# Patient Record
Sex: MALE | Race: WHITE | NOT HISPANIC OR LATINO | Employment: OTHER | URBAN - METROPOLITAN AREA
[De-identification: names, ages, dates, MRNs, and addresses within clinical notes are randomized per-mention and may not be internally consistent; named-entity substitution may affect disease eponyms.]

---

## 2019-07-30 ENCOUNTER — HOSPITAL ENCOUNTER (EMERGENCY)
Facility: HOSPITAL | Age: 84
Discharge: LEFT BEFORE DISCHARGE | End: 2019-07-05

## 2019-08-13 ENCOUNTER — CLINICAL SUPPORT NO REQUIREMENTS (OUTPATIENT)
Dept: CARDIOLOGY | Facility: CLINIC | Age: 84
End: 2019-08-13

## 2019-08-13 ENCOUNTER — OFFICE VISIT (OUTPATIENT)
Dept: CARDIOLOGY | Facility: CLINIC | Age: 84
End: 2019-08-13

## 2019-08-13 VITALS
HEART RATE: 60 BPM | WEIGHT: 132.5 LBS | DIASTOLIC BLOOD PRESSURE: 75 MMHG | OXYGEN SATURATION: 100 % | SYSTOLIC BLOOD PRESSURE: 154 MMHG

## 2019-08-13 DIAGNOSIS — Z95.0 PACEMAKER: ICD-10-CM

## 2019-08-13 DIAGNOSIS — I44.0 FIRST DEGREE ATRIOVENTRICULAR BLOCK: ICD-10-CM

## 2019-08-13 DIAGNOSIS — I10 ESSENTIAL HYPERTENSION: ICD-10-CM

## 2019-08-13 DIAGNOSIS — Z95.0 PACEMAKER: Primary | ICD-10-CM

## 2019-08-13 DIAGNOSIS — Z95.1 HX OF CABG: Primary | ICD-10-CM

## 2019-08-13 DIAGNOSIS — E78.5 DYSLIPIDEMIA: ICD-10-CM

## 2019-08-13 DIAGNOSIS — Z95.820 STATUS POST ANGIOPLASTY WITH STENT: ICD-10-CM

## 2019-08-13 PROCEDURE — 93000 ELECTROCARDIOGRAM COMPLETE: CPT | Performed by: INTERNAL MEDICINE

## 2019-08-13 PROCEDURE — 93280 PM DEVICE PROGR EVAL DUAL: CPT | Performed by: INTERNAL MEDICINE

## 2019-08-13 PROCEDURE — 99214 OFFICE O/P EST MOD 30 MIN: CPT | Performed by: INTERNAL MEDICINE

## 2019-08-13 RX ORDER — SIMVASTATIN 80 MG
TABLET ORAL
COMMUNITY
Start: 2014-09-24 | End: 2019-08-13

## 2019-08-13 RX ORDER — ATENOLOL 25 MG/1
TABLET ORAL
COMMUNITY
Start: 2014-09-24

## 2019-08-13 RX ORDER — OXYBUTYNIN CHLORIDE 5 MG/1
5 TABLET ORAL 3 TIMES DAILY
COMMUNITY

## 2019-08-13 RX ORDER — LISINOPRIL 5 MG/1
TABLET ORAL
COMMUNITY
Start: 2014-09-24 | End: 2019-08-13

## 2019-08-13 RX ORDER — NIACIN 500 MG
TABLET ORAL
COMMUNITY
Start: 2014-09-24 | End: 2019-08-13

## 2019-08-13 RX ORDER — CETIRIZINE HYDROCHLORIDE 10 MG/1
10 TABLET ORAL DAILY
COMMUNITY

## 2019-08-13 RX ORDER — FLUTICASONE PROPIONATE 50 MCG
SPRAY, SUSPENSION (ML) NASAL
COMMUNITY
Start: 2017-05-11

## 2019-08-13 RX ORDER — MECLIZINE HCL 12.5 MG/1
12.5 TABLET ORAL 3 TIMES DAILY PRN
COMMUNITY

## 2019-08-13 NOTE — PROGRESS NOTES
Encounter Date:08/13/2019  Last seen 1/17/2019      Patient ID: Rajesh Hoyos is a 89 y.o. male.    Chief Complaint:  Status post CABG  Status post pacemaker  Hypertension  Dyslipidemia  Status post stent      History of Present Illness  Since I have last seen, the patient has been without any chest discomfort ,shortness of breath, palpitations, dizziness or syncope.  Denies having any headache ,abdominal pain ,nausea, vomiting , diarrhea constipation, loss of weight or loss of appetite.  Denies having any excessive bruising ,hematuria or blood in the stool.    Review of all systems negative except as indicated      Assessment and Plan       //////////////////////  Impression  ===============    - Status post CABG 06/2000.  Status post stent to graft to the right coronary artery 08/09/2013.      Collier to LAD was patent.  SVG to diagonal branch was patent.  SVG to the marginal branch had stent 60% mid segment disease (FFR did not indicate any significant disease).  Status post stent to graft to the right coronary artery and obtuse marginal branch normal 2009. Status post stent to graft to the right coronary artery in October 2008. 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    -status post permanent dual-chamber pacemaker implantation ( Opencaretronic MRI compatible) 01/07/2016.  There may be some atrial lead over sensing.  Reprogramming was done.    History of significant 1st degree AV block.  AV dissociation prior to CABG.  History of intermittent nonsustained ventricular tachycardia (asymptomatic.) event monitor was negative for advanced AV blocks In the past     - Hypertension and dyslipidemia     -History of prostate carcinoma.  Status post orchiectomy     -. Status post right lower lobectomy  ==============  Plan  ============  EKG showed dual-chamber pacemaker rhythm.   Patient is not having any angina pectoris or congestive heart failure.  Interrogation of the pacemaker revealed excellent pacing  parameters.     Followup in the office in Six months with pacemaker interrogation.    Medications are reviewed and updated.  ///////////////         Diagnosis Plan   1. Hx of CABG  ECG 12 Lead   2. Pacemaker  ECG 12 Lead   3. First degree atrioventricular block     4. Dyslipidemia     5. Essential hypertension  ECG 12 Lead   6. Status post angioplasty with stent  ECG 12 Lead   LAB RESULTS (LAST 7 DAYS)    CBC        BMP        CMP         BNP        TROPONIN        CoAg        Creatinine Clearance  CrCl cannot be calculated (No order found.).    ABG        Radiology  No radiology results for the last day                The following portions of the patient's history were reviewed and updated as appropriate: allergies, current medications, past family history, past medical history, past social history, past surgical history and problem list.    Review of Systems   Constitution: Positive for malaise/fatigue.   Cardiovascular: Negative for chest pain, leg swelling, palpitations and syncope.   Respiratory: Negative for shortness of breath.    Skin: Positive for rash (chest ).   Gastrointestinal: Negative for nausea and vomiting.   Neurological: Positive for light-headedness. Negative for dizziness and numbness.         Current Outpatient Medications:   •  aspirin 81 MG tablet, ASPIRIN 81 MG ORAL TABLET, Disp: , Rfl:   •  atenolol (TENORMIN) 25 MG tablet, ATENOLOL 25 MG TABS, Disp: , Rfl:   •  cetirizine (zyrTEC) 10 MG tablet, Take 10 mg by mouth Daily., Disp: , Rfl:   •  fluticasone (FLONASE) 50 MCG/ACT nasal spray, FLUTICASONE PROPIONATE 50 MCG/ACT SUSP, Disp: , Rfl:   •  meclizine (ANTIVERT) 12.5 MG tablet, Take 12.5 mg by mouth 3 (Three) Times a Day As Needed for dizziness., Disp: , Rfl:   •  oxybutynin (DITROPAN) 5 MG tablet, Take 5 mg by mouth 3 (Three) Times a Day., Disp: , Rfl:     No Known Allergies    Family History   Problem Relation Age of Onset   • Heart disease Maternal Uncle        Past Surgical History:    Procedure Laterality Date   • CORONARY ANGIOPLASTY WITH STENT PLACEMENT  2013/2009/2008   • CORONARY ARTERY BYPASS GRAFT     • PACEMAKER IMPLANTATION  2016       Past Medical History:   Diagnosis Date   • Hyperlipidemia    • Hypertension        Family History   Problem Relation Age of Onset   • Heart disease Maternal Uncle        Social History     Socioeconomic History   • Marital status:      Spouse name: Not on file   • Number of children: Not on file   • Years of education: Not on file   • Highest education level: Not on file   Tobacco Use   • Smoking status: Former Smoker     Types: Cigarettes   • Smokeless tobacco: Never Used           ECG 12 Lead  Date/Time: 8/13/2019 3:24 PM  Performed by: Refugio Bartholomew MD  Authorized by: Refugio Bartholomew MD   Comparison: compared with previous ECG   Comments: Completely paced dual-chamber pacemaker rhythm.  60/min nonspecific ST-T wave changes no ectopy normal axis normal intervals.  No change from 1/17/2019              Objective:       Physical Exam    /75 (BP Location: Left arm, Patient Position: Sitting, Cuff Size: Adult)   Pulse 60   Wt 60.1 kg (132 lb 8 oz)   SpO2 100%   The patient is alert, oriented and in no distress.    Vital signs as noted above.    Head and neck revealed no carotid bruits or jugular venous distension.  No thyromegaly or lymphadenopathy is present.    Lungs clear.  No wheezing.  Breath sounds are normal bilaterally.    Heart normal first and second heart sounds.  No murmur..  No pericardial rub is present.  No gallop is present.    Abdomen soft and nontender.  No organomegaly is present.    Extremities revealed good peripheral pulses without any pedal edema.    Skin warm and dry.  Pacemaker site looks normal.    Musculoskeletal system is grossly normal.    CNS grossly normal.

## 2019-11-20 ENCOUNTER — CLINICAL SUPPORT NO REQUIREMENTS (OUTPATIENT)
Dept: CARDIOLOGY | Facility: CLINIC | Age: 84
End: 2019-11-20

## 2019-11-20 DIAGNOSIS — R00.1 BRADYCARDIA: Primary | ICD-10-CM

## 2019-11-20 DIAGNOSIS — Z95.0 PACEMAKER: ICD-10-CM

## 2019-11-20 PROCEDURE — 93296 REM INTERROG EVL PM/IDS: CPT | Performed by: INTERNAL MEDICINE

## 2019-11-20 PROCEDURE — 93294 REM INTERROG EVL PM/LDLS PM: CPT | Performed by: INTERNAL MEDICINE

## 2020-02-19 ENCOUNTER — CLINICAL SUPPORT NO REQUIREMENTS (OUTPATIENT)
Dept: CARDIOLOGY | Facility: CLINIC | Age: 85
End: 2020-02-19

## 2020-02-19 DIAGNOSIS — R00.1 BRADYCARDIA: ICD-10-CM

## 2020-02-19 DIAGNOSIS — Z95.0 PACEMAKER: Primary | ICD-10-CM

## 2020-02-27 ENCOUNTER — CLINICAL SUPPORT NO REQUIREMENTS (OUTPATIENT)
Dept: CARDIOLOGY | Facility: CLINIC | Age: 85
End: 2020-02-27

## 2020-02-27 ENCOUNTER — OFFICE VISIT (OUTPATIENT)
Dept: CARDIOLOGY | Facility: CLINIC | Age: 85
End: 2020-02-27

## 2020-02-27 VITALS
SYSTOLIC BLOOD PRESSURE: 159 MMHG | HEIGHT: 69 IN | BODY MASS INDEX: 20.18 KG/M2 | OXYGEN SATURATION: 97 % | WEIGHT: 136.25 LBS | HEART RATE: 67 BPM | DIASTOLIC BLOOD PRESSURE: 66 MMHG

## 2020-02-27 DIAGNOSIS — Z95.820 STATUS POST ANGIOPLASTY WITH STENT: ICD-10-CM

## 2020-02-27 DIAGNOSIS — I10 ESSENTIAL HYPERTENSION: Primary | ICD-10-CM

## 2020-02-27 DIAGNOSIS — Z95.0 PACEMAKER: ICD-10-CM

## 2020-02-27 DIAGNOSIS — I44.0 FIRST DEGREE ATRIOVENTRICULAR BLOCK: ICD-10-CM

## 2020-02-27 DIAGNOSIS — E78.5 DYSLIPIDEMIA: ICD-10-CM

## 2020-02-27 DIAGNOSIS — I44.0 FIRST DEGREE ATRIOVENTRICULAR BLOCK: Primary | ICD-10-CM

## 2020-02-27 DIAGNOSIS — Z95.1 HX OF CABG: ICD-10-CM

## 2020-02-27 PROCEDURE — 99214 OFFICE O/P EST MOD 30 MIN: CPT | Performed by: INTERNAL MEDICINE

## 2020-02-27 PROCEDURE — 93280 PM DEVICE PROGR EVAL DUAL: CPT | Performed by: INTERNAL MEDICINE

## 2020-02-27 PROCEDURE — 93000 ELECTROCARDIOGRAM COMPLETE: CPT | Performed by: INTERNAL MEDICINE

## 2020-02-27 NOTE — PROGRESS NOTES
Encounter Date:02/27/2020  Last seen 8/13/2019      Patient ID: Rajesh Hoyos is a 89 y.o. male.    Chief Complaint:  Status post stent  Status post CABG  Status post pacemaker  Hypertension  Dyslipidemia      History of Present Illness  Occasional lightheadedness.  Since I have last seen, the patient has been without any chest discomfort ,shortness of breath, palpitations, dizziness or syncope.  Denies having any headache ,abdominal pain ,nausea, vomiting , diarrhea constipation, loss of weight or loss of appetite.  Denies having any excessive bruising ,hematuria or blood in the stool.    Review of all systems negative except as indicated  Assessment and Plan     //////////////////////  Impression  ===============    - Status post CABG 06/2000.  Status post stent to graft to the right coronary artery 08/09/2013.       Collier to LAD was patent.  SVG to diagonal branch was patent.  SVG to the marginal branch had stent 60% mid segment disease (FFR did not indicate any significant disease).  Status post stent to graft to the right coronary artery and obtuse marginal branch normal 2009. Status post stent to graft to the right coronary artery in October 2008. 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     -status post permanent dual-chamber pacemaker implantation ( Medtronic MRI compatible) 01/07/2016.  There may be some atrial lead over sensing.  Reprogramming was done.     History of significant 1st degree AV block.  AV dissociation prior to CABG.  History of intermittent nonsustained ventricular tachycardia (asymptomatic.) event monitor was negative for advanced AV blocks In the past      - Hypertension and dyslipidemia      -History of prostate carcinoma.  Status post orchiectomy      -. Status post right lower lobectomy  ==============  Plan  ============  EKG showed dual-chamber pacemaker rhythm.  (Atrial sensed ventricular paced rhythm  Patient is not having any angina pectoris or congestive heart  failure.  Interrogation of the pacemaker revealed excellent pacing parameters.  Battery status is 4 years.  Followup in the office in Six months with pacemaker interrogation.  Medications are reviewed and updated.  Further plan will depend on patient's progress.  ///////////////             Diagnosis Plan   1. Essential hypertension  ECG 12 Lead   2. Status post angioplasty with stent     3. Hx of CABG     4. Dyslipidemia     5. Pacemaker     6. First degree atrioventricular block     LAB RESULTS (LAST 7 DAYS)    CBC        BMP        CMP         BNP        TROPONIN        CoAg        Creatinine Clearance  CrCl cannot be calculated (No successful lab value found.).    ABG        Radiology  No radiology results for the last day                The following portions of the patient's history were reviewed and updated as appropriate: allergies, current medications, past family history, past medical history, past social history, past surgical history and problem list.    Review of Systems   Constitution: Negative for malaise/fatigue.   Cardiovascular: Negative for chest pain, leg swelling, palpitations and syncope.   Respiratory: Negative for shortness of breath.    Skin: Negative for rash.   Gastrointestinal: Negative for nausea and vomiting.   Neurological: Positive for dizziness, light-headedness and vertigo. Negative for numbness.         Current Outpatient Medications:   •  aspirin 81 MG tablet, ASPIRIN 81 MG ORAL TABLET, Disp: , Rfl:   •  atenolol (TENORMIN) 25 MG tablet, ATENOLOL 25 MG TABS, Disp: , Rfl:   •  cetirizine (zyrTEC) 10 MG tablet, Take 10 mg by mouth Daily., Disp: , Rfl:   •  fluticasone (FLONASE) 50 MCG/ACT nasal spray, FLUTICASONE PROPIONATE 50 MCG/ACT SUSP, Disp: , Rfl:   •  meclizine (ANTIVERT) 12.5 MG tablet, Take 12.5 mg by mouth 3 (Three) Times a Day As Needed for dizziness., Disp: , Rfl:   •  oxybutynin (DITROPAN) 5 MG tablet, Take 5 mg by mouth 3 (Three) Times a Day., Disp: , Rfl:     No Known  "Allergies    Family History   Problem Relation Age of Onset   • Heart disease Maternal Uncle        Past Surgical History:   Procedure Laterality Date   • CORONARY ANGIOPLASTY WITH STENT PLACEMENT  2013/2009/2008   • CORONARY ARTERY BYPASS GRAFT     • PACEMAKER IMPLANTATION  2016       Past Medical History:   Diagnosis Date   • Hyperlipidemia    • Hypertension        Family History   Problem Relation Age of Onset   • Heart disease Maternal Uncle        Social History     Socioeconomic History   • Marital status:      Spouse name: Not on file   • Number of children: Not on file   • Years of education: Not on file   • Highest education level: Not on file   Tobacco Use   • Smoking status: Former Smoker     Types: Cigarettes   • Smokeless tobacco: Never Used           ECG 12 Lead  Date/Time: 2/27/2020 9:53 AM  Performed by: Refugio Bartholomew MD  Authorized by: Refugio Bartholomew MD   Comparison: compared with previous ECG   Similar to previous ECG  Comments: Atrial sensed ventricular paced rhythm 62/min nonspecific ST-T wave changes no ectopy.  No change from previous tracing              Objective:       Physical Exam    /66 (BP Location: Left arm, Patient Position: Sitting, Cuff Size: Adult)   Pulse 67   Ht 175.3 cm (69\")   Wt 61.8 kg (136 lb 4 oz)   SpO2 97%   BMI 20.12 kg/m²   The patient is alert, oriented and in no distress.    Vital signs as noted above.    Head and neck revealed no carotid bruits or jugular venous distension.  No thyromegaly or lymphadenopathy is present.    Lungs clear.  No wheezing.  Breath sounds are normal bilaterally.    Heart normal first and second heart sounds.  No murmur..  No pericardial rub is present.  No gallop is present.    Abdomen soft and nontender.  No organomegaly is present.    Extremities revealed good peripheral pulses without any pedal edema.    Skin warm and dry.  Pacemaker site looks normal.    Musculoskeletal system is grossly normal.    CNS grossly " normal.

## 2020-06-01 ENCOUNTER — CLINICAL SUPPORT NO REQUIREMENTS (OUTPATIENT)
Dept: CARDIOLOGY | Facility: CLINIC | Age: 85
End: 2020-06-01

## 2020-06-01 DIAGNOSIS — R00.1 BRADYCARDIA: ICD-10-CM

## 2020-06-01 DIAGNOSIS — Z95.0 PACEMAKER: Primary | ICD-10-CM

## 2020-06-01 PROCEDURE — 93294 REM INTERROG EVL PM/LDLS PM: CPT | Performed by: INTERNAL MEDICINE

## 2020-06-01 PROCEDURE — 93296 REM INTERROG EVL PM/IDS: CPT | Performed by: INTERNAL MEDICINE

## 2020-09-10 ENCOUNTER — CLINICAL SUPPORT NO REQUIREMENTS (OUTPATIENT)
Dept: CARDIOLOGY | Facility: CLINIC | Age: 85
End: 2020-09-10

## 2020-09-10 ENCOUNTER — OFFICE VISIT (OUTPATIENT)
Dept: CARDIOLOGY | Facility: CLINIC | Age: 85
End: 2020-09-10

## 2020-09-10 VITALS
SYSTOLIC BLOOD PRESSURE: 147 MMHG | DIASTOLIC BLOOD PRESSURE: 73 MMHG | HEIGHT: 69 IN | HEART RATE: 59 BPM | WEIGHT: 128 LBS | BODY MASS INDEX: 18.96 KG/M2

## 2020-09-10 DIAGNOSIS — I10 ESSENTIAL HYPERTENSION: ICD-10-CM

## 2020-09-10 DIAGNOSIS — Z95.0 PACEMAKER: Primary | ICD-10-CM

## 2020-09-10 DIAGNOSIS — R00.1 BRADYCARDIA: ICD-10-CM

## 2020-09-10 DIAGNOSIS — I44.0 FIRST DEGREE ATRIOVENTRICULAR BLOCK: ICD-10-CM

## 2020-09-10 DIAGNOSIS — Z95.820 STATUS POST ANGIOPLASTY WITH STENT: ICD-10-CM

## 2020-09-10 DIAGNOSIS — E78.5 DYSLIPIDEMIA: ICD-10-CM

## 2020-09-10 DIAGNOSIS — Z95.1 HX OF CABG: ICD-10-CM

## 2020-09-10 PROCEDURE — 93280 PM DEVICE PROGR EVAL DUAL: CPT | Performed by: INTERNAL MEDICINE

## 2020-09-10 PROCEDURE — 99214 OFFICE O/P EST MOD 30 MIN: CPT | Performed by: INTERNAL MEDICINE

## 2020-09-10 PROCEDURE — 93000 ELECTROCARDIOGRAM COMPLETE: CPT | Performed by: INTERNAL MEDICINE

## 2020-09-10 NOTE — PROGRESS NOTES
Encounter Date:09/10/2020  Last seen 2/27/2020      Patient ID: Rajesh Hoyos is a 89 y.o. male.    Chief Complaint:    Status post stent  Status post CABG  Status post pacemaker  Hypertension  Dyslipidemia        History of Present Illness    Since I have last seen, the patient has been without any chest discomfort ,shortness of breath, palpitations, dizziness or syncope.  Denies having any headache ,abdominal pain ,nausea, vomiting , diarrhea constipation, loss of weight or loss of appetite.  Denies having any excessive bruising ,hematuria or blood in the stool.    Review of all systems negative except as indicated.    Assessment and Plan      //////////////////////  Impression  ===============    - Status post CABG 06/2000.  Status post stent to graft to the right coronary artery 08/09/2013.       Collier to LAD was patent.  SVG to diagonal branch was patent.  SVG to the marginal branch had stent 60% mid segment disease (FFR did not indicate any significant disease).  Status post stent to graft to the right coronary artery and obtuse marginal branch normal 2009. Status post stent to graft to the right coronary artery in October 2008. 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     -status post permanent dual-chamber pacemaker implantation ( CUPRtronic MRI compatible) 01/07/2016.  There may be some atrial lead over sensing.  Reprogramming was done.     History of significant 1st degree AV block.  AV dissociation prior to CABG.  History of intermittent nonsustained ventricular tachycardia (asymptomatic.) event monitor was negative for advanced AV blocks In the past      - Hypertension and dyslipidemia      -History of prostate carcinoma.  Status post orchiectomy      -. Status post right lower lobectomy  ==============  Plan  ============  EKG showed dual-chamber pacemaker rhythm.    Patient is not having any angina pectoris or congestive heart failure.  Interrogation of the pacemaker revealed excellent  pacing parameters.  Battery status is 4 years.  Followup in the office in Six months with pacemaker interrogation.  Medications are reviewed and updated.  Further plan will depend on patient's progress.  ///////////////             Diagnosis Plan   1. Pacemaker     2. Bradycardia     3. Hx of CABG     4. Essential hypertension     5. Dyslipidemia     6. Status post angioplasty with stent     LAB RESULTS (LAST 7 DAYS)    CBC        BMP        CMP         BNP        TROPONIN        CoAg        Creatinine Clearance  CrCl cannot be calculated (No successful lab value found.).    ABG        Radiology  No radiology results for the last day                The following portions of the patient's history were reviewed and updated as appropriate: allergies, current medications, past family history, past medical history, past social history, past surgical history and problem list.    Review of Systems   Constitution: Negative for malaise/fatigue.   Cardiovascular: Negative for chest pain, leg swelling, palpitations and syncope.   Respiratory: Negative for shortness of breath.    Skin: Negative for rash.   Gastrointestinal: Negative for nausea and vomiting.   Neurological: Negative for dizziness, light-headedness and numbness.         Current Outpatient Medications:   •  aspirin 81 MG tablet, ASPIRIN 81 MG ORAL TABLET, Disp: , Rfl:   •  atenolol (TENORMIN) 25 MG tablet, ATENOLOL 25 MG TABS, Disp: , Rfl:   •  cetirizine (zyrTEC) 10 MG tablet, Take 10 mg by mouth Daily., Disp: , Rfl:   •  fluticasone (FLONASE) 50 MCG/ACT nasal spray, FLUTICASONE PROPIONATE 50 MCG/ACT SUSP, Disp: , Rfl:   •  meclizine (ANTIVERT) 12.5 MG tablet, Take 12.5 mg by mouth 3 (Three) Times a Day As Needed for dizziness., Disp: , Rfl:   •  oxybutynin (DITROPAN) 5 MG tablet, Take 5 mg by mouth 3 (Three) Times a Day., Disp: , Rfl:     No Known Allergies    Family History   Problem Relation Age of Onset   • Heart disease Maternal Uncle        Past Surgical  "History:   Procedure Laterality Date   • CORONARY ANGIOPLASTY WITH STENT PLACEMENT  2013/2009/2008   • CORONARY ARTERY BYPASS GRAFT     • PACEMAKER IMPLANTATION  2016       Past Medical History:   Diagnosis Date   • Hyperlipidemia    • Hypertension        Family History   Problem Relation Age of Onset   • Heart disease Maternal Uncle        Social History     Socioeconomic History   • Marital status:      Spouse name: Not on file   • Number of children: Not on file   • Years of education: Not on file   • Highest education level: Not on file   Tobacco Use   • Smoking status: Former Smoker     Types: Cigarettes   • Smokeless tobacco: Never Used           ECG 12 Lead  Date/Time: 9/10/2020 10:31 AM  Performed by: Refugio Bartholomew MD  Authorized by: Refugio Bartholomew MD   Comparison: compared with previous ECG   Similar to previous ECG  Comparison to previous ECG: Completely paced dual-chamber pacemaker rhythm 60/min nonspecific ST-T wave changes no ectopy no change from 2/27/2020                Objective:       Physical Exam    /73 (BP Location: Left arm, Patient Position: Sitting, Cuff Size: Adult)   Pulse 59   Ht 175.3 cm (69\")   Wt 58.1 kg (128 lb)   BMI 18.90 kg/m²   The patient is alert, oriented and in no distress.    Vital signs as noted above.    Head and neck revealed no carotid bruits or jugular venous distension.  No thyromegaly or lymphadenopathy is present.    Lungs clear.  No wheezing.  Breath sounds are normal bilaterally.    Heart normal first and second heart sounds.  No murmur..  No pericardial rub is present.  No gallop is present.    Abdomen soft and nontender.  No organomegaly is present.    Extremities revealed good peripheral pulses without any pedal edema.    Skin warm and dry.  Pacemaker site looks normal.    Musculoskeletal system is grossly normal.    CNS grossly normal.        "

## 2020-11-23 PROBLEM — E78.5 HYPERLIPIDEMIA: Status: ACTIVE | Noted: 2020-11-23

## 2021-04-01 ENCOUNTER — CLINICAL SUPPORT NO REQUIREMENTS (OUTPATIENT)
Dept: CARDIOLOGY | Facility: CLINIC | Age: 86
End: 2021-04-01

## 2021-04-01 ENCOUNTER — OFFICE VISIT (OUTPATIENT)
Dept: CARDIOLOGY | Facility: CLINIC | Age: 86
End: 2021-04-01

## 2021-04-01 VITALS
HEART RATE: 60 BPM | WEIGHT: 133.5 LBS | BODY MASS INDEX: 19.77 KG/M2 | HEIGHT: 69 IN | DIASTOLIC BLOOD PRESSURE: 98 MMHG | SYSTOLIC BLOOD PRESSURE: 129 MMHG | TEMPERATURE: 96.6 F | OXYGEN SATURATION: 98 %

## 2021-04-01 DIAGNOSIS — I44.0 FIRST DEGREE ATRIOVENTRICULAR BLOCK: ICD-10-CM

## 2021-04-01 DIAGNOSIS — Z95.0 PACEMAKER: Primary | ICD-10-CM

## 2021-04-01 DIAGNOSIS — E78.5 DYSLIPIDEMIA: ICD-10-CM

## 2021-04-01 DIAGNOSIS — Z95.1 HX OF CABG: ICD-10-CM

## 2021-04-01 DIAGNOSIS — Z95.820 STATUS POST ANGIOPLASTY WITH STENT: ICD-10-CM

## 2021-04-01 DIAGNOSIS — I10 ESSENTIAL HYPERTENSION: ICD-10-CM

## 2021-04-01 DIAGNOSIS — R00.1 BRADYCARDIA: ICD-10-CM

## 2021-04-01 DIAGNOSIS — R00.1 BRADYCARDIA, SINUS: ICD-10-CM

## 2021-04-01 PROCEDURE — 99214 OFFICE O/P EST MOD 30 MIN: CPT | Performed by: INTERNAL MEDICINE

## 2021-04-01 PROCEDURE — 93280 PM DEVICE PROGR EVAL DUAL: CPT | Performed by: INTERNAL MEDICINE

## 2021-04-01 RX ORDER — TAMSULOSIN HYDROCHLORIDE 0.4 MG/1
1 CAPSULE ORAL DAILY
COMMUNITY

## 2021-04-01 RX ORDER — LISINOPRIL 20 MG/1
20 TABLET ORAL DAILY
COMMUNITY

## 2021-04-01 NOTE — PROGRESS NOTES
Encounter Date:04/01/2021  Last seen 19 2020    Patient ID: Rajesh Hoyos is a 89 y.o. male.    Chief Complaint:  Status post stent  Status post CABG  Status post pacemaker  Hypertension  Dyslipidemia        History of Present Illness     Since I have last seen, the patient has been without any chest discomfort ,shortness of breath, palpitations, dizziness or syncope.  Denies having any headache ,abdominal pain ,nausea, vomiting , diarrhea constipation, loss of weight or loss of appetite.  Denies having any excessive bruising ,hematuria or blood in the stool.    Review of all systems negative except as indicated.    Reviewed ROS.     Assessment and Plan      //////////////////////  Impression  ===============    - Status post CABG 06/2000.  Status post stent to graft to the right coronary artery 08/09/2013.       Collier to LAD was patent.  SVG to diagonal branch was patent.  SVG to the marginal branch had stent 60% mid segment disease (FFR did not indicate any significant disease).  Status post stent to graft to the right coronary artery and obtuse marginal branch normal 2009. Status post stent to graft to the right coronary artery in October 2008. 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     -status post permanent dual-chamber pacemaker implantation ( Luminescenttronic MRI compatible) 01/07/2016.  There may be some atrial lead over sensing.  Reprogramming was done.     History of significant 1st degree AV block.  AV dissociation prior to CABG.  History of intermittent nonsustained ventricular tachycardia (asymptomatic.) event monitor was negative for advanced AV blocks In the past      - Hypertension and dyslipidemia      -History of prostate carcinoma.  Status post orchiectomy      -. Status post right lower lobectomy  ==============  Plan  ============  Status post CABG.  Patient is not having any angina pectoris or congestive heart failure.    Status post pacemaker  Pacemaker site looks  normal.  Interrogation of the pacemaker revealed excellent pacing parameters.  Battery status is 3 years.    Hypertension-129/98  Continue lisinopril atenolol    Dyslipidemia-diet controlled    Followup in the office in Six months with pacemaker interrogation.  Medications are reviewed and updated.  Further plan will depend on patient's progress.  ///////////////               Diagnosis Plan   1. Pacemaker     2. Hx of CABG     3. Essential hypertension     4. Bradycardia     5. Dyslipidemia     6. Status post angioplasty with stent     7. First degree atrioventricular block     LAB RESULTS (LAST 7 DAYS)    CBC        BMP        CMP         BNP        TROPONIN        CoAg        Creatinine Clearance  CrCl cannot be calculated (No successful lab value found.).    ABG        Radiology  No radiology results for the last day                The following portions of the patient's history were reviewed and updated as appropriate: allergies, current medications, past family history, past medical history, past social history, past surgical history and problem list.    Review of Systems   Constitutional: Negative for malaise/fatigue.   Cardiovascular: Negative for chest pain, leg swelling, palpitations and syncope.   Respiratory: Negative for shortness of breath.    Skin: Negative for rash.   Gastrointestinal: Negative for nausea and vomiting.   Neurological: Positive for dizziness. Negative for light-headedness and numbness.         Current Outpatient Medications:   •  aspirin 81 MG tablet, ASPIRIN 81 MG ORAL TABLET, Disp: , Rfl:   •  atenolol (TENORMIN) 25 MG tablet, ATENOLOL 25 MG TABS, Disp: , Rfl:   •  cetirizine (zyrTEC) 10 MG tablet, Take 10 mg by mouth Daily., Disp: , Rfl:   •  fluticasone (FLONASE) 50 MCG/ACT nasal spray, FLUTICASONE PROPIONATE 50 MCG/ACT SUSP, Disp: , Rfl:   •  lisinopril (PRINIVIL,ZESTRIL) 20 MG tablet, Take 20 mg by mouth Daily., Disp: , Rfl:   •  meclizine (ANTIVERT) 12.5 MG tablet, Take 12.5 mg  "by mouth 3 (Three) Times a Day As Needed for dizziness., Disp: , Rfl:   •  oxybutynin (DITROPAN) 5 MG tablet, Take 5 mg by mouth 3 (Three) Times a Day., Disp: , Rfl:   •  tamsulosin (FLOMAX) 0.4 MG capsule 24 hr capsule, Take 1 capsule by mouth Daily., Disp: , Rfl:     No Known Allergies    Family History   Problem Relation Age of Onset   • Heart disease Maternal Uncle        Past Surgical History:   Procedure Laterality Date   • CORONARY ANGIOPLASTY WITH STENT PLACEMENT  2013/2009/2008   • CORONARY ARTERY BYPASS GRAFT     • PACEMAKER IMPLANTATION  2016       Past Medical History:   Diagnosis Date   • Hyperlipidemia    • Hypertension        Family History   Problem Relation Age of Onset   • Heart disease Maternal Uncle        Social History     Socioeconomic History   • Marital status:      Spouse name: Not on file   • Number of children: Not on file   • Years of education: Not on file   • Highest education level: Not on file   Tobacco Use   • Smoking status: Former Smoker     Types: Cigarettes   • Smokeless tobacco: Never Used         Procedures      Objective:       Physical Exam    /98   Pulse 60   Temp 96.6 °F (35.9 °C)   Ht 175.3 cm (69\")   Wt 60.6 kg (133 lb 8 oz)   SpO2 98%   BMI 19.71 kg/m²   The patient is alert, oriented and in no distress.    Vital signs as noted above.    Head and neck revealed no carotid bruits or jugular venous distension.  No thyromegaly or lymphadenopathy is present.    Lungs clear.  No wheezing.  Breath sounds are normal bilaterally.    Heart normal first and second heart sounds.  No murmur..  No pericardial rub is present.  No gallop is present.    Abdomen soft and nontender.  No organomegaly is present.    Extremities revealed good peripheral pulses without any pedal edema.    Skin warm and dry.  Pacemaker site looks normal.    Musculoskeletal system is grossly normal.    CNS grossly normal.        "

## 2021-10-18 ENCOUNTER — CLINICAL SUPPORT NO REQUIREMENTS (OUTPATIENT)
Dept: CARDIOLOGY | Facility: CLINIC | Age: 86
End: 2021-10-18

## 2021-10-18 ENCOUNTER — OFFICE VISIT (OUTPATIENT)
Dept: CARDIOLOGY | Facility: CLINIC | Age: 86
End: 2021-10-18

## 2021-10-18 VITALS
HEIGHT: 69 IN | HEART RATE: 58 BPM | DIASTOLIC BLOOD PRESSURE: 64 MMHG | BODY MASS INDEX: 19.26 KG/M2 | SYSTOLIC BLOOD PRESSURE: 148 MMHG | WEIGHT: 130 LBS | OXYGEN SATURATION: 99 %

## 2021-10-18 DIAGNOSIS — Z95.1 HX OF CABG: ICD-10-CM

## 2021-10-18 DIAGNOSIS — R00.1 BRADYCARDIA, SINUS: ICD-10-CM

## 2021-10-18 DIAGNOSIS — Z95.0 PACEMAKER: Primary | ICD-10-CM

## 2021-10-18 DIAGNOSIS — I44.0 FIRST DEGREE ATRIOVENTRICULAR BLOCK: ICD-10-CM

## 2021-10-18 DIAGNOSIS — Z95.820 STATUS POST ANGIOPLASTY WITH STENT: ICD-10-CM

## 2021-10-18 DIAGNOSIS — E78.5 DYSLIPIDEMIA: ICD-10-CM

## 2021-10-18 DIAGNOSIS — I10 ESSENTIAL HYPERTENSION: ICD-10-CM

## 2021-10-18 PROCEDURE — 93000 ELECTROCARDIOGRAM COMPLETE: CPT | Performed by: INTERNAL MEDICINE

## 2021-10-18 PROCEDURE — 93280 PM DEVICE PROGR EVAL DUAL: CPT | Performed by: INTERNAL MEDICINE

## 2021-10-18 PROCEDURE — 99214 OFFICE O/P EST MOD 30 MIN: CPT | Performed by: INTERNAL MEDICINE

## 2021-10-18 NOTE — PROGRESS NOTES
Encounter Date:10/18/2021  Last seen 4/1/2021      Patient ID: Rajesh Hoyos is a 89 y.o. male.    Chief Complaint:  Status post stent  Status post CABG  Status post pacemaker  Hypertension  Dyslipidemia        History of Present Illness     Since I have last seen, the patient has been without any chest discomfort ,shortness of breath, palpitations, dizziness or syncope.  Denies having any headache ,abdominal pain ,nausea, vomiting , diarrhea constipation, loss of weight or loss of appetite.  Denies having any excessive bruising ,hematuria or blood in the stool.    Review of all systems negative except as indicated.    Reviewed ROS.   Assessment and Plan      //////////////////////  Impression  ===============    - Status post CABG 06/2000.  Status post stent to graft to the right coronary artery 08/09/2013.       Collier to LAD was patent.  SVG to diagonal branch was patent.  SVG to the marginal branch had stent 60% mid segment disease (FFR did not indicate any significant disease).  Status post stent to graft to the right coronary artery and obtuse marginal branch normal 2009. Status post stent to graft to the right coronary artery in October 2008. 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     -status post permanent dual-chamber pacemaker implantation ( Medtronic MRI compatible) 01/07/2016.  There may be some atrial lead over sensing.  Reprogramming was done.     History of significant 1st degree AV block.  AV dissociation prior to CABG.  History of intermittent nonsustained ventricular tachycardia (asymptomatic.) event monitor was negative for advanced AV blocks In the past      - Hypertension and dyslipidemia      -History of prostate carcinoma.  Status post orchiectomy      -. Status post right lower lobectomy  ==============  Plan  ============  Status post CABG.  Patient is not having any angina pectoris or congestive heart failure.  EKG showed dual-chamber pacemaker rhythm.     Status post  pacemaker  Pacemaker site looks normal.  Interrogation of the pacemaker revealed excellent pacing parameters.  Battery status is 2.5 years.     Hypertension-148/64  Continue lisinopril atenolol     Dyslipidemia-diet controlled     Followup in the office in Six months with pacemaker interrogation.    Medications are reviewed and updated.  Further plan will depend on patient's progress.  ///////////////                    Diagnosis Plan   1. Pacemaker     2. Bradycardia, sinus  ECG 12 Lead   3. Hx of CABG  ECG 12 Lead   4. First degree atrioventricular block     5. Dyslipidemia     6. Status post angioplasty with stent     7. Essential hypertension     LAB RESULTS (LAST 7 DAYS)    CBC        BMP        CMP         BNP        TROPONIN        CoAg        Creatinine Clearance  CrCl cannot be calculated (No successful lab value found.).    ABG        Radiology  No radiology results for the last day                The following portions of the patient's history were reviewed and updated as appropriate: allergies, current medications, past family history, past medical history, past social history, past surgical history and problem list.    Review of Systems   Constitutional: Negative for malaise/fatigue.   Cardiovascular: Negative for chest pain, leg swelling, palpitations and syncope.   Respiratory: Negative for shortness of breath.    Skin: Negative for rash.   Gastrointestinal: Negative for nausea and vomiting.   Neurological: Negative for dizziness, light-headedness and numbness.   All other systems reviewed and are negative.        Current Outpatient Medications:   •  aspirin 81 MG tablet, ASPIRIN 81 MG ORAL TABLET, Disp: , Rfl:   •  atenolol (TENORMIN) 25 MG tablet, ATENOLOL 25 MG TABS, Disp: , Rfl:   •  cetirizine (zyrTEC) 10 MG tablet, Take 10 mg by mouth Daily., Disp: , Rfl:   •  fluticasone (FLONASE) 50 MCG/ACT nasal spray, FLUTICASONE PROPIONATE 50 MCG/ACT SUSP, Disp: , Rfl:   •  lisinopril (PRINIVIL,ZESTRIL) 20  "MG tablet, Take 20 mg by mouth Daily., Disp: , Rfl:   •  meclizine (ANTIVERT) 12.5 MG tablet, Take 12.5 mg by mouth 3 (Three) Times a Day As Needed for dizziness., Disp: , Rfl:   •  oxybutynin (DITROPAN) 5 MG tablet, Take 5 mg by mouth 3 (Three) Times a Day., Disp: , Rfl:   •  tamsulosin (FLOMAX) 0.4 MG capsule 24 hr capsule, Take 1 capsule by mouth Daily., Disp: , Rfl:     No Known Allergies    Family History   Problem Relation Age of Onset   • Heart disease Maternal Uncle        Past Surgical History:   Procedure Laterality Date   • CORONARY ANGIOPLASTY WITH STENT PLACEMENT  2013/2009/2008   • CORONARY ARTERY BYPASS GRAFT     • PACEMAKER IMPLANTATION  2016       Past Medical History:   Diagnosis Date   • Hyperlipidemia    • Hypertension        Family History   Problem Relation Age of Onset   • Heart disease Maternal Uncle        Social History     Socioeconomic History   • Marital status:    Tobacco Use   • Smoking status: Former Smoker     Types: Cigarettes   • Smokeless tobacco: Never Used   Vaping Use   • Vaping Use: Never used   Substance and Sexual Activity   • Alcohol use: Not Currently   • Drug use: Never   • Sexual activity: Defer           ECG 12 Lead    Date/Time: 10/18/2021 10:52 AM  Performed by: Refugio Bartholomew MD  Authorized by: Refugio Bartholomew MD   Comparison: compared with previous ECG   Similar to previous ECG  Comparison to previous ECG: Dual-chamber pacemaker rhythm 59/min nonspecific ST-T wave changes no ectopy no significant change from 9/10/2020                Objective:       Physical Exam    /64 (BP Location: Right arm, Patient Position: Sitting, Cuff Size: Adult)   Pulse 58   Ht 175.3 cm (69\")   Wt 59 kg (130 lb)   SpO2 99%   BMI 19.20 kg/m²   The patient is alert, oriented and in no distress.    Vital signs as noted above.    Head and neck revealed no carotid bruits or jugular venous distension.  No thyromegaly or lymphadenopathy is present.    Lungs clear.  No " wheezing.  Breath sounds are normal bilaterally.    Heart normal first and second heart sounds.  No murmur..  No pericardial rub is present.  No gallop is present.    Abdomen soft and nontender.  No organomegaly is present.    Extremities revealed good peripheral pulses without any pedal edema.    Skin warm and dry. Pacemaker site looks normal.    Musculoskeletal system is grossly normal.    CNS grossly normal.    Reviewed and unchanged from last visit.

## 2022-05-02 ENCOUNTER — CLINICAL SUPPORT NO REQUIREMENTS (OUTPATIENT)
Dept: CARDIOLOGY | Facility: CLINIC | Age: 87
End: 2022-05-02

## 2022-05-02 ENCOUNTER — OFFICE VISIT (OUTPATIENT)
Dept: CARDIOLOGY | Facility: CLINIC | Age: 87
End: 2022-05-02

## 2022-05-02 VITALS
DIASTOLIC BLOOD PRESSURE: 60 MMHG | BODY MASS INDEX: 19.11 KG/M2 | HEART RATE: 61 BPM | OXYGEN SATURATION: 97 % | HEIGHT: 69 IN | WEIGHT: 129 LBS | SYSTOLIC BLOOD PRESSURE: 123 MMHG

## 2022-05-02 DIAGNOSIS — Z95.1 HX OF CABG: ICD-10-CM

## 2022-05-02 DIAGNOSIS — Z95.0 PACEMAKER: Primary | ICD-10-CM

## 2022-05-02 DIAGNOSIS — E78.5 DYSLIPIDEMIA: ICD-10-CM

## 2022-05-02 DIAGNOSIS — I10 ESSENTIAL HYPERTENSION: ICD-10-CM

## 2022-05-02 DIAGNOSIS — Z95.820 STATUS POST ANGIOPLASTY WITH STENT: ICD-10-CM

## 2022-05-02 DIAGNOSIS — R00.1 BRADYCARDIA, SINUS: ICD-10-CM

## 2022-05-02 DIAGNOSIS — I44.0 FIRST DEGREE ATRIOVENTRICULAR BLOCK: ICD-10-CM

## 2022-05-02 PROCEDURE — 99214 OFFICE O/P EST MOD 30 MIN: CPT | Performed by: INTERNAL MEDICINE

## 2022-05-02 PROCEDURE — 93000 ELECTROCARDIOGRAM COMPLETE: CPT | Performed by: INTERNAL MEDICINE

## 2022-05-02 PROCEDURE — 93280 PM DEVICE PROGR EVAL DUAL: CPT | Performed by: INTERNAL MEDICINE

## 2022-05-02 NOTE — PROGRESS NOTES
Encounter Date:05/02/2022  Last seen 10/18/2021    Patient ID: Rajesh Hoyos is a 89 y.o. male.    Chief Complaint:  Status post stent  Status post CABG  Status post pacemaker  Hypertension  Dyslipidemia        History of Present Illness     Since I have last seen, the patient has been without any chest discomfort ,shortness of breath, palpitations, dizziness or syncope.  Denies having any headache ,abdominal pain ,nausea, vomiting , diarrhea constipation, loss of weight or loss of appetite.  Denies having any excessive bruising ,hematuria or blood in the stool.    Review of all systems negative except as indicated.    Reviewed ROS.    Assessment and Plan      //////////////////////  Impression  ===============    - Status post CABG 06/2000.    Status post stent to graft to the right coronary artery 08/09/2013.       Collier to LAD was patent.  SVG to diagonal branch was patent.  SVG to the marginal branch had stent 60% mid segment disease (FFR did not indicate any significant disease).  Status post stent to graft to the right coronary artery and obtuse marginal branch normal 2009. Status post stent to graft to the right coronary artery in October 2008. 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     -status post permanent dual-chamber pacemaker implantation ( Medtronic MRI compatible) 01/07/2016.  There may be some atrial lead over sensing.  Reprogramming was done.     History of significant 1st degree AV block.  AV dissociation prior to CABG.  History of intermittent nonsustained ventricular tachycardia (asymptomatic.) event monitor was negative for advanced AV blocks In the past      - Hypertension and dyslipidemia      -History of prostate carcinoma.  Status post orchiectomy      -. Status post right lower lobectomy  ==============  Plan  ============  Status post CABG.  Patient is not having any angina pectoris or congestive heart failure.  EKG showed dual-chamber pacemaker rhythm.-5/2/2022     Status  post pacemaker  Pacemaker site looks normal.  Interrogation of the pacemaker revealed excellent pacing parameters.  Battery status is 2.5 years.     Hypertension- 123/60  Continue lisinopril atenolol     Dyslipidemia-diet controlled     Followup in the office in Six months with pacemaker interrogation.     Medications are reviewed and updated.    Further plan will depend on patient's progress.  ///////////////                           Diagnosis Plan   1. Pacemaker  ECG 12 Lead   2. Bradycardia, sinus  ECG 12 Lead   3. Hx of CABG  ECG 12 Lead   4. Status post angioplasty with stent  ECG 12 Lead   5. Dyslipidemia  ECG 12 Lead   6. Essential hypertension  ECG 12 Lead   7. First degree atrioventricular block  ECG 12 Lead   LAB RESULTS (LAST 7 DAYS)    CBC        BMP        CMP         BNP        TROPONIN        CoAg        Creatinine Clearance  CrCl cannot be calculated (No successful lab value found.).    ABG        Radiology  No radiology results for the last day                The following portions of the patient's history were reviewed and updated as appropriate: allergies, current medications, past family history, past medical history, past social history, past surgical history and problem list.    Review of Systems   Constitutional: Negative for malaise/fatigue.   Cardiovascular: Negative for chest pain, leg swelling, palpitations and syncope.   Respiratory: Negative for shortness of breath.    Skin: Negative for rash.   Gastrointestinal: Negative for nausea and vomiting.   Neurological: Negative for dizziness, light-headedness and numbness.   All other systems reviewed and are negative.        Current Outpatient Medications:   •  atenolol (TENORMIN) 25 MG tablet, ATENOLOL 25 MG TABS, Disp: , Rfl:   •  cetirizine (zyrTEC) 10 MG tablet, Take 10 mg by mouth Daily., Disp: , Rfl:   •  fluticasone (FLONASE) 50 MCG/ACT nasal spray, FLUTICASONE PROPIONATE 50 MCG/ACT SUSP, Disp: , Rfl:   •  lisinopril (PRINIVIL,ZESTRIL)  "20 MG tablet, Take 20 mg by mouth Daily., Disp: , Rfl:   •  aspirin 81 MG tablet, ASPIRIN 81 MG ORAL TABLET, Disp: , Rfl:   •  meclizine (ANTIVERT) 12.5 MG tablet, Take 12.5 mg by mouth 3 (Three) Times a Day As Needed for dizziness., Disp: , Rfl:   •  oxybutynin (DITROPAN) 5 MG tablet, Take 5 mg by mouth 3 (Three) Times a Day., Disp: , Rfl:   •  tamsulosin (FLOMAX) 0.4 MG capsule 24 hr capsule, Take 1 capsule by mouth Daily., Disp: , Rfl:     No Known Allergies    Family History   Problem Relation Age of Onset   • Heart disease Maternal Uncle        Past Surgical History:   Procedure Laterality Date   • CORONARY ANGIOPLASTY WITH STENT PLACEMENT  2013/2009/2008   • CORONARY ARTERY BYPASS GRAFT     • PACEMAKER IMPLANTATION  2016       Past Medical History:   Diagnosis Date   • Hyperlipidemia    • Hypertension        Family History   Problem Relation Age of Onset   • Heart disease Maternal Uncle        Social History     Socioeconomic History   • Marital status:    Tobacco Use   • Smoking status: Former Smoker     Types: Cigarettes   • Smokeless tobacco: Never Used   Vaping Use   • Vaping Use: Never used   Substance and Sexual Activity   • Alcohol use: Not Currently   • Drug use: Never   • Sexual activity: Defer           ECG 12 Lead    Date/Time: 5/2/2022 11:35 AM  Performed by: Refugio Bartholomew MD  Authorized by: Refugio Bartholomew MD   Comparison: compared with previous ECG   Similar to previous ECG  Comparison to previous ECG: Dual-chamber pacemaker rhythm 60/min nonspecific ST-T wave changes no ectopy no significant change from 10/18/2021                Objective:       Physical Exam    /60 (BP Location: Left arm, Patient Position: Sitting, Cuff Size: Adult)   Pulse 61   Ht 175.3 cm (69\")   Wt 58.5 kg (129 lb)   SpO2 97%   BMI 19.05 kg/m²   The patient is alert, oriented and in no distress.    Vital signs as noted above.    Head and neck revealed no carotid bruits or jugular venous distension.  " No thyromegaly or lymphadenopathy is present.    Lungs clear.  No wheezing.  Breath sounds are normal bilaterally.    Heart normal first and second heart sounds.  No murmur..  No pericardial rub is present.  No gallop is present.    Abdomen soft and nontender.  No organomegaly is present.    Extremities revealed good peripheral pulses without any pedal edema.    Skin warm and dry.  Pacemaker site looks normal    Musculoskeletal system is grossly normal.    CNS grossly normal.    Reviewed and updated.

## 2022-11-14 ENCOUNTER — OFFICE VISIT (OUTPATIENT)
Dept: CARDIOLOGY | Facility: CLINIC | Age: 87
End: 2022-11-14

## 2022-11-14 ENCOUNTER — CLINICAL SUPPORT NO REQUIREMENTS (OUTPATIENT)
Dept: CARDIOLOGY | Facility: CLINIC | Age: 87
End: 2022-11-14

## 2022-11-14 VITALS
SYSTOLIC BLOOD PRESSURE: 138 MMHG | DIASTOLIC BLOOD PRESSURE: 65 MMHG | HEIGHT: 69 IN | WEIGHT: 131 LBS | BODY MASS INDEX: 19.4 KG/M2 | HEART RATE: 63 BPM

## 2022-11-14 DIAGNOSIS — I10 ESSENTIAL HYPERTENSION: ICD-10-CM

## 2022-11-14 DIAGNOSIS — R00.1 BRADYCARDIA, SINUS: ICD-10-CM

## 2022-11-14 DIAGNOSIS — E78.5 DYSLIPIDEMIA: ICD-10-CM

## 2022-11-14 DIAGNOSIS — Z95.0 PACEMAKER: ICD-10-CM

## 2022-11-14 DIAGNOSIS — I44.0 FIRST DEGREE ATRIOVENTRICULAR BLOCK: ICD-10-CM

## 2022-11-14 DIAGNOSIS — Z95.0 PACEMAKER: Primary | ICD-10-CM

## 2022-11-14 DIAGNOSIS — Z95.1 HX OF CABG: Primary | ICD-10-CM

## 2022-11-14 DIAGNOSIS — Z95.820 STATUS POST ANGIOPLASTY WITH STENT: ICD-10-CM

## 2022-11-14 DIAGNOSIS — R00.1 BRADYCARDIA: ICD-10-CM

## 2022-11-14 PROCEDURE — 93000 ELECTROCARDIOGRAM COMPLETE: CPT | Performed by: INTERNAL MEDICINE

## 2022-11-14 PROCEDURE — 93280 PM DEVICE PROGR EVAL DUAL: CPT | Performed by: INTERNAL MEDICINE

## 2022-11-14 PROCEDURE — 99214 OFFICE O/P EST MOD 30 MIN: CPT | Performed by: INTERNAL MEDICINE

## 2022-11-14 NOTE — PROGRESS NOTES
Encounter Date:11/14/2022  Last seen 5/2/2022      Patient ID: Rajesh Hoyos is a 89 y.o. male.    Chief Complaint:  Status post stent  Status post CABG  Status post pacemaker  Hypertension  Dyslipidemia        History of Present Illness     Since I have last seen, the patient has been without any chest discomfort ,shortness of breath, palpitations, dizziness or syncope.  Denies having any headache ,abdominal pain ,nausea, vomiting , diarrhea constipation, loss of weight or loss of appetite.  Denies having any excessive bruising ,hematuria or blood in the stool.    Review of all systems negative except as indicated.    Reviewed ROS.  Assessment and Plan      //////////////////////  Impression  ===============    - Status post CABG 06/2000.    Status post stent to graft to the right coronary artery 08/09/2013.       Collier to LAD was patent.  SVG to diagonal branch was patent.  SVG to the marginal branch had stent 60% mid segment disease (FFR did not indicate any significant disease).  Status post stent to graft to the right coronary artery and obtuse marginal branch normal 2009. Status post stent to graft to the right coronary artery in October 2008. 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     -status post permanent dual-chamber pacemaker implantation ( Medtronic MRI compatible) 01/07/2016.  There may be some atrial lead over sensing.  Reprogramming was done.     History of significant 1st degree AV block.  AV dissociation prior to CABG.  History of intermittent nonsustained ventricular tachycardia (asymptomatic.) event monitor was negative for advanced AV blocks In the past      - Hypertension and dyslipidemia      -History of prostate carcinoma.  Status post orchiectomy      -. Status post right lower lobectomy  ==============  Plan  ============  Status post CABG.  Patient is not having any angina pectoris or congestive heart failure.  EKG showed dual-chamber pacemaker rhythm.-  11/14/2022    Status  post pacemaker  Pacemaker site looks normal.  Interrogation of the pacemaker revealed excellent pacing parameters.  Battery status is 2 years.     Hypertension-  138/65  Continue lisinopril atenolol     Dyslipidemia-diet controlled     Followup in the office in 6 months with pacemaker interrogation.     Medications are reviewed and updated.     Further plan will depend on patient's progress.  ///////////////                           Diagnosis Plan   1. Hx of CABG  ECG 12 Lead      2. Pacemaker        3. Bradycardia, sinus        4. Status post angioplasty with stent        5. First degree atrioventricular block        6. Dyslipidemia        7. Essential hypertension        8. Bradycardia        LAB RESULTS (LAST 7 DAYS)    CBC        BMP        CMP         BNP        TROPONIN        CoAg        Creatinine Clearance  CrCl cannot be calculated (No successful lab value found.).    ABG        Radiology  No radiology results for the last day                The following portions of the patient's history were reviewed and updated as appropriate: allergies, current medications, past family history, past medical history, past social history, past surgical history and problem list.    Review of Systems   Constitutional: Negative for malaise/fatigue.   Cardiovascular: Negative for chest pain, dyspnea on exertion, leg swelling and palpitations.   Respiratory: Negative for cough and shortness of breath.    Gastrointestinal: Negative for abdominal pain, nausea and vomiting.   Neurological: Negative for dizziness, focal weakness, headaches, light-headedness and numbness.   All other systems reviewed and are negative.        Current Outpatient Medications:   •  aspirin 81 MG tablet, ASPIRIN 81 MG ORAL TABLET, Disp: , Rfl:   •  atenolol (TENORMIN) 25 MG tablet, ATENOLOL 25 MG TABS, Disp: , Rfl:   •  cetirizine (zyrTEC) 10 MG tablet, Take 10 mg by mouth Daily., Disp: , Rfl:   •  fluticasone (FLONASE) 50 MCG/ACT nasal spray,  "FLUTICASONE PROPIONATE 50 MCG/ACT SUSP, Disp: , Rfl:   •  lisinopril (PRINIVIL,ZESTRIL) 20 MG tablet, Take 20 mg by mouth Daily., Disp: , Rfl:   •  meclizine (ANTIVERT) 12.5 MG tablet, Take 12.5 mg by mouth 3 (Three) Times a Day As Needed for dizziness., Disp: , Rfl:   •  oxybutynin (DITROPAN) 5 MG tablet, Take 5 mg by mouth 3 (Three) Times a Day., Disp: , Rfl:   •  tamsulosin (FLOMAX) 0.4 MG capsule 24 hr capsule, Take 1 capsule by mouth Daily., Disp: , Rfl:     No Known Allergies    Family History   Problem Relation Age of Onset   • Heart disease Maternal Uncle        Past Surgical History:   Procedure Laterality Date   • CORONARY ANGIOPLASTY WITH STENT PLACEMENT  2013/2009/2008   • CORONARY ARTERY BYPASS GRAFT     • PACEMAKER IMPLANTATION  2016       Past Medical History:   Diagnosis Date   • Hyperlipidemia    • Hypertension        Family History   Problem Relation Age of Onset   • Heart disease Maternal Uncle        Social History     Socioeconomic History   • Marital status:    Tobacco Use   • Smoking status: Former     Types: Cigarettes   • Smokeless tobacco: Never   Vaping Use   • Vaping Use: Never used   Substance and Sexual Activity   • Alcohol use: Not Currently   • Drug use: Never   • Sexual activity: Defer           ECG 12 Lead    Date/Time: 11/14/2022 11:42 AM  Performed by: Refugio Bartholomew MD  Authorized by: Refugio Bartholomew MD   Comparison: compared with previous ECG   Similar to previous ECG  Comparison to previous ECG: Dual-chamber pacemaker rhythm 63/min nonspecific ST-T wave changes no ectopy no significant change from 5/2/2022                Objective:       Physical Exam    /65 (BP Location: Right arm, Patient Position: Sitting, Cuff Size: Adult)   Pulse 63   Ht 175.3 cm (69\")   Wt 59.4 kg (131 lb)   BMI 19.35 kg/m²   The patient is alert, oriented and in no distress.    Vital signs as noted above.    Head and neck revealed no carotid bruits or jugular venous distension.  No " thyromegaly or lymphadenopathy is present.    Lungs clear.  No wheezing.  Breath sounds are normal bilaterally.    Heart normal first and second heart sounds.  No murmur..  No pericardial rub is present.  No gallop is present.    Abdomen soft and nontender.  No organomegaly is present.    Extremities revealed good peripheral pulses without any pedal edema.    Skin warm and dry.  Pacemaker site looks normal.    Musculoskeletal system is grossly normal.    CNS grossly normal.    Reviewed and updated.

## 2023-03-28 ENCOUNTER — TELEPHONE (OUTPATIENT)
Dept: CARDIOLOGY | Facility: CLINIC | Age: 88
End: 2023-03-28
Payer: MEDICARE

## 2023-03-28 NOTE — TELEPHONE ENCOUNTER
Caller: BRETT SAINI    Relationship: Emergency Contact    Best call back number: [phone]      PATIENT WAS EXPERIENCING SKIPPING IN HI HEART RATE, PLEASE CALL HIM TO DISCUSS

## 2023-03-28 NOTE — TELEPHONE ENCOUNTER
Spoke to patients emergency contact Mukesh titus states patient is wearing the heart monitor and would like for you to check on it when we [facility]. Patients emergency contact is going to schedule an appt for patient to be seen.

## 2023-04-03 ENCOUNTER — OFFICE VISIT (OUTPATIENT)
Dept: CARDIOLOGY | Facility: CLINIC | Age: 88
End: 2023-04-03
Payer: MEDICARE

## 2023-04-03 VITALS
DIASTOLIC BLOOD PRESSURE: 74 MMHG | HEART RATE: 84 BPM | HEIGHT: 69 IN | SYSTOLIC BLOOD PRESSURE: 137 MMHG | WEIGHT: 116 LBS | OXYGEN SATURATION: 97 % | BODY MASS INDEX: 17.18 KG/M2

## 2023-04-03 DIAGNOSIS — Z95.0 PACEMAKER: Primary | ICD-10-CM

## 2023-04-03 DIAGNOSIS — I44.0 FIRST DEGREE ATRIOVENTRICULAR BLOCK: ICD-10-CM

## 2023-04-03 DIAGNOSIS — I10 ESSENTIAL HYPERTENSION: ICD-10-CM

## 2023-04-03 DIAGNOSIS — I48.0 PAROXYSMAL ATRIAL FIBRILLATION: ICD-10-CM

## 2023-04-03 DIAGNOSIS — E78.5 DYSLIPIDEMIA: ICD-10-CM

## 2023-04-03 DIAGNOSIS — Z95.820 STATUS POST ANGIOPLASTY WITH STENT: ICD-10-CM

## 2023-04-03 DIAGNOSIS — R00.1 BRADYCARDIA, SINUS: ICD-10-CM

## 2023-04-03 DIAGNOSIS — Z95.1 HX OF CABG: ICD-10-CM

## 2023-04-03 PROCEDURE — 1160F RVW MEDS BY RX/DR IN RCRD: CPT | Performed by: INTERNAL MEDICINE

## 2023-04-03 PROCEDURE — 93000 ELECTROCARDIOGRAM COMPLETE: CPT | Performed by: INTERNAL MEDICINE

## 2023-04-03 PROCEDURE — 1159F MED LIST DOCD IN RCRD: CPT | Performed by: INTERNAL MEDICINE

## 2023-04-03 PROCEDURE — 99214 OFFICE O/P EST MOD 30 MIN: CPT | Performed by: INTERNAL MEDICINE

## 2023-04-03 NOTE — PROGRESS NOTES
"Encounter Date:04/03/2023    Last seen 11/14/2022      Patient ID: Rajesh Hoyos is a 89 y.o. male.    Chief Complaint:  Status post stent  Status post CABG  Status post pacemaker  Hypertension  Dyslipidemia        History of Present Illness  Patient recently was admitted to the hospital with a UTI and patient was told that he has \"irregular heart rhythm\".  Patient was started on metoprolol XL 50 mg a day.     Since I have last seen, the patient has been without any chest discomfort ,shortness of breath, palpitations, dizziness or syncope.  Denies having any headache ,abdominal pain ,nausea, vomiting , diarrhea constipation, loss of weight or loss of appetite.  Denies having any excessive bruising ,hematuria or blood in the stool.    Review of all systems negative except as indicated.    Reviewed ROS.    Assessment and Plan      //////////////////////  Impression  ===============    - Status post CABG 06/2000.    Status post stent to graft to the right coronary artery 08/09/2013.       [place] to LAD was patent.  SVG to diagonal branch was patent.  SVG to the marginal branch had stent 60% mid segment disease (FFR did not indicate any significant disease).  Status post stent to graft to the right coronary artery and obtuse marginal branch normal 2009. Status post stent to graft to the right coronary artery in October 2008. 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     -status post permanent dual-chamber pacemaker implantation ( [employer] MRI compatible) 01/07/2016.  There may be some atrial lead over sensing.  Reprogramming was done.     History of significant 1st degree AV block.  AV dissociation prior to CABG.  History of intermittent nonsustained ventricular tachycardia (asymptomatic.) event monitor was negative for advanced AV blocks In the past    - Paroxysmal atrial fibrillation     Hypertension and dyslipidemia      -History of prostate carcinoma.  Status post orchiectomy      -. Status post right " "lower lobectomy  ==============  Plan  ============  Patient recently was admitted to the hospital with a UTI and patient was told that he has \"irregular heart rhythm\".  Patient was started on metoprolol XL 50 mg a day.  EKG today showed ventricular pacemaker rhythm.  Intrinsic rhythm is atrial fibrillation.    Paroxysmal atrial fibrillation.  Continue metoprolol XL 50 mg a day and discontinue atenolol.    Anticoagulation was discussed with patient and patient's son.  Risk of cardioembolic episode versus bleeding was discussed.  Given his age etc. anticoagulation was thought to be high risk for him.  Option was given to patient and patient's son and they prefer to stay off anticoagulation.    Status post CABG.  Patient is not having any angina pectoris or congestive heart failure.  EKG showed dual-chamber pacemaker rhythm.-  11/14/2022  EKG 4/3/2023 revealed ventricular pacemaker rhythm intrinsic rhythm is atrial fibrillation 84/min.     Status post pacemaker  Pacemaker site looks normal.  Interrogation of the pacemaker revealed excellent pacing parameters.  Battery status is 2 years.     Hypertension- 137/74  Continue lisinopril atenolol     Dyslipidemia-diet controlled     Followup in the office at her regularly scheduled appointment with pacemaker interrogation.     Medications are reviewed and updated.     Further plan will depend on patient's progress.  ///////////////                           Diagnosis Plan   1. Pacemaker  ECG 12 Lead      2. Hx of CABG  ECG 12 Lead      3. Bradycardia, sinus  ECG 12 Lead      4. First degree atrioventricular block        5. Dyslipidemia        6. Status post angioplasty with stent        7. Essential hypertension        8. Paroxysmal atrial fibrillation        LAB RESULTS (LAST 7 DAYS)    CBC        BMP        CMP         BNP        TROPONIN        CoAg        Creatinine Clearance  CrCl cannot be calculated (No successful lab value found.).    ABG        Radiology  No " radiology results for the last day                The following portions of the patient's history were reviewed and updated as appropriate: allergies, current medications, past family history, past medical history, past social history, past surgical history and problem list.    Review of Systems   Constitutional: Negative for malaise/fatigue.   Cardiovascular: Negative for chest pain, dyspnea on exertion, leg swelling and palpitations.   Respiratory: Negative for cough and shortness of breath.    Gastrointestinal: Negative for abdominal pain, nausea and vomiting.   Neurological: Negative for dizziness, focal weakness, headaches, light-headedness and numbness.   All other systems reviewed and are negative.        Current Outpatient Medications:   •  aspirin 81 MG tablet, ASPIRIN 81 MG ORAL TABLET, Disp: , Rfl:   •  atenolol (TENORMIN) 25 MG tablet, ATENOLOL 25 MG TABS, Disp: , Rfl:   •  cetirizine (zyrTEC) 10 MG tablet, Take 1 tablet by mouth Daily., Disp: , Rfl:   •  fluticasone (FLONASE) 50 MCG/ACT nasal spray, FLUTICASONE PROPIONATE 50 MCG/ACT SUSP, Disp: , Rfl:   •  lisinopril (PRINIVIL,ZESTRIL) 20 MG tablet, Take 1 tablet by mouth Daily., Disp: , Rfl:   •  meclizine (ANTIVERT) 12.5 MG tablet, Take 1 tablet by mouth 3 (Three) Times a Day As Needed for Dizziness., Disp: , Rfl:   •  oxybutynin (DITROPAN) 5 MG tablet, Take 1 tablet by mouth 3 (Three) Times a Day., Disp: , Rfl:   •  tamsulosin (FLOMAX) 0.4 MG capsule 24 hr capsule, Take 1 capsule by mouth Daily., Disp: , Rfl:     No Known Allergies    Family History   Problem Relation Age of Onset   • Heart disease Maternal Uncle        Past Surgical History:   Procedure Laterality Date   • CORONARY ANGIOPLASTY WITH STENT PLACEMENT  2013/2009/2008   • CORONARY ARTERY BYPASS GRAFT     • PACEMAKER IMPLANTATION  2016       Past Medical History:   Diagnosis Date   • Hyperlipidemia    • Hypertension        Family History   Problem Relation Age of Onset   • Heart disease  "Maternal Uncle        Social History     Socioeconomic History   • Marital status:    Tobacco Use   • Smoking status: Former     Types: Cigarettes     Passive exposure: Past   • Smokeless tobacco: Never   Vaping Use   • Vaping Use: Never used   Substance and Sexual Activity   • Alcohol use: Not Currently   • Drug use: Never   • Sexual activity: Defer           ECG 12 Lead    Date/Time: 4/3/2023 2:26 PM  Performed by: Refugio Bartholomew MD  Authorized by: Refugio Bartholomew MD   Comparison: compared with previous ECG   Comparison to previous ECG: Ventricular pacemaker rhythm.  Intrinsic rhythm is atrial fibrillation 84/min nonspecific ST-T wave changes no ectopy compared to 11/14/2022 atrial fibrillation is new.                Objective:       Physical Exam    /74 (BP Location: Right arm, Patient Position: Sitting, Cuff Size: Adult)   Pulse 84   Ht 175.3 cm (69\")   Wt 52.6 kg (116 lb)   SpO2 97%   BMI 17.13 kg/m²   The patient is alert, oriented and in no distress.    Vital signs as noted above.    Head and neck revealed no carotid bruits or jugular venous distension.  No thyromegaly or lymphadenopathy is present.    Lungs clear.  No wheezing.  Breath sounds are normal bilaterally.    Heart normal first and second heart sounds.  No murmur..  No pericardial rub is present.  No gallop is present.    Abdomen soft and nontender.  No organomegaly is present.    Extremities revealed good peripheral pulses without any pedal edema.    Skin warm and dry.  Pacemaker site looks normal.    Musculoskeletal system is grossly normal.    CNS grossly normal.    Reviewed and updated.        "

## 2023-07-31 ENCOUNTER — OFFICE VISIT (OUTPATIENT)
Dept: CARDIOLOGY | Facility: CLINIC | Age: 88
End: 2023-07-31
Payer: MEDICARE

## 2023-07-31 ENCOUNTER — CLINICAL SUPPORT NO REQUIREMENTS (OUTPATIENT)
Dept: CARDIOLOGY | Facility: CLINIC | Age: 88
End: 2023-07-31
Payer: MEDICARE

## 2023-07-31 VITALS
WEIGHT: 100 LBS | OXYGEN SATURATION: 96 % | HEART RATE: 88 BPM | HEIGHT: 69 IN | BODY MASS INDEX: 14.81 KG/M2 | DIASTOLIC BLOOD PRESSURE: 75 MMHG | SYSTOLIC BLOOD PRESSURE: 122 MMHG

## 2023-07-31 DIAGNOSIS — I10 ESSENTIAL HYPERTENSION: ICD-10-CM

## 2023-07-31 DIAGNOSIS — R00.1 BRADYCARDIA, SINUS: ICD-10-CM

## 2023-07-31 DIAGNOSIS — Z95.0 PACEMAKER: Primary | ICD-10-CM

## 2023-07-31 DIAGNOSIS — E78.5 DYSLIPIDEMIA: ICD-10-CM

## 2023-07-31 DIAGNOSIS — R00.1 BRADYCARDIA: ICD-10-CM

## 2023-07-31 DIAGNOSIS — R06.02 SHORTNESS OF BREATH: ICD-10-CM

## 2023-07-31 DIAGNOSIS — I48.0 PAROXYSMAL ATRIAL FIBRILLATION: ICD-10-CM

## 2023-07-31 DIAGNOSIS — Z95.820 STATUS POST ANGIOPLASTY WITH STENT: ICD-10-CM

## 2023-07-31 DIAGNOSIS — I44.0 FIRST DEGREE ATRIOVENTRICULAR BLOCK: ICD-10-CM

## 2023-07-31 DIAGNOSIS — Z95.1 HX OF CABG: ICD-10-CM

## 2023-07-31 PROCEDURE — 99214 OFFICE O/P EST MOD 30 MIN: CPT | Performed by: INTERNAL MEDICINE

## 2023-07-31 PROCEDURE — 93280 PM DEVICE PROGR EVAL DUAL: CPT | Performed by: INTERNAL MEDICINE

## 2023-07-31 PROCEDURE — 1160F RVW MEDS BY RX/DR IN RCRD: CPT | Performed by: INTERNAL MEDICINE

## 2023-07-31 PROCEDURE — 1159F MED LIST DOCD IN RCRD: CPT | Performed by: INTERNAL MEDICINE
